# Patient Record
Sex: MALE | Race: NATIVE HAWAIIAN OR OTHER PACIFIC ISLANDER | NOT HISPANIC OR LATINO | Employment: FULL TIME | URBAN - METROPOLITAN AREA
[De-identification: names, ages, dates, MRNs, and addresses within clinical notes are randomized per-mention and may not be internally consistent; named-entity substitution may affect disease eponyms.]

---

## 2020-06-08 ENCOUNTER — NON-PROVIDER VISIT (OUTPATIENT)
Dept: OCCUPATIONAL MEDICINE | Facility: CLINIC | Age: 40
End: 2020-06-08

## 2020-06-08 DIAGNOSIS — Z11.1 ENCOUNTER FOR PPD TEST: Primary | ICD-10-CM

## 2020-06-08 PROCEDURE — 86580 TB INTRADERMAL TEST: CPT | Performed by: NURSE PRACTITIONER

## 2020-06-10 ENCOUNTER — OFFICE VISIT (OUTPATIENT)
Dept: OCCUPATIONAL MEDICINE | Facility: CLINIC | Age: 40
End: 2020-06-10

## 2020-06-10 ENCOUNTER — APPOINTMENT (OUTPATIENT)
Dept: URGENT CARE | Facility: CLINIC | Age: 40
End: 2020-06-10

## 2020-06-10 ENCOUNTER — NON-PROVIDER VISIT (OUTPATIENT)
Dept: OCCUPATIONAL MEDICINE | Facility: CLINIC | Age: 40
End: 2020-06-10

## 2020-06-10 ENCOUNTER — APPOINTMENT (OUTPATIENT)
Dept: RADIOLOGY | Facility: IMAGING CENTER | Age: 40
End: 2020-06-10
Attending: NURSE PRACTITIONER

## 2020-06-10 DIAGNOSIS — R76.11 POSITIVE PPD: ICD-10-CM

## 2020-06-10 LAB — TB WHEAL 3D P 5 TU DIAM: NORMAL MM

## 2020-06-10 PROCEDURE — 71045 X-RAY EXAM CHEST 1 VIEW: CPT | Mod: TC | Performed by: NURSE PRACTITIONER

## 2020-06-10 PROCEDURE — 8916 PR CLEARANCE ONLY: Performed by: NURSE PRACTITIONER

## 2020-06-11 NOTE — PROGRESS NOTES
Self-pay TB screening.  Positive PPD.  Chest x-ray negative.  Referred to PCP and [facility] department.

## 2022-01-19 ENCOUNTER — HOSPITAL ENCOUNTER (EMERGENCY)
Facility: MEDICAL CENTER | Age: 42
End: 2022-01-20
Attending: EMERGENCY MEDICINE
Payer: COMMERCIAL

## 2022-01-19 DIAGNOSIS — M10.9 ACUTE GOUT INVOLVING TOE OF LEFT FOOT, UNSPECIFIED CAUSE: ICD-10-CM

## 2022-01-19 PROCEDURE — 99283 EMERGENCY DEPT VISIT LOW MDM: CPT

## 2022-01-20 VITALS
RESPIRATION RATE: 16 BRPM | HEIGHT: 75 IN | SYSTOLIC BLOOD PRESSURE: 131 MMHG | BODY MASS INDEX: 36.9 KG/M2 | TEMPERATURE: 98.3 F | HEART RATE: 75 BPM | OXYGEN SATURATION: 96 % | DIASTOLIC BLOOD PRESSURE: 74 MMHG | WEIGHT: 296.74 LBS

## 2022-01-20 PROCEDURE — A9270 NON-COVERED ITEM OR SERVICE: HCPCS | Performed by: EMERGENCY MEDICINE

## 2022-01-20 PROCEDURE — 700102 HCHG RX REV CODE 250 W/ 637 OVERRIDE(OP): Performed by: EMERGENCY MEDICINE

## 2022-01-20 RX ORDER — COLCHICINE 0.6 MG/1
0.6 TABLET ORAL 2 TIMES DAILY
Qty: 28 TABLET | Refills: 0 | Status: SHIPPED | OUTPATIENT
Start: 2022-01-20 | End: 2022-02-03

## 2022-01-20 RX ORDER — COLCHICINE 0.6 MG/1
1.2 TABLET ORAL ONCE
Status: COMPLETED | OUTPATIENT
Start: 2022-01-20 | End: 2022-01-20

## 2022-01-20 RX ADMIN — COLCHICINE 1.2 MG: 0.6 TABLET ORAL at 00:25

## 2022-01-20 NOTE — ED NOTES
Discharge home with instructions, rxn  and follow up care reviewed and given to patient with verbal understanding.    Slowly ambulated out of the ED

## 2022-01-20 NOTE — ED TRIAGE NOTES
"41 yr old male to triage  Chief Complaint   Patient presents with   • Toe Pain     Patient report of left big toe pain with slight redness and swollen this morning.  Denies any recent trauma      /84   Pulse 77   Temp 36.8 °C (98.3 °F) (Temporal)   Resp 16   Ht 1.905 m (6' 3\")   Wt (!) 135 kg (296 lb 11.8 oz)   SpO2 94%   BMI 37.09 kg/m²     Has this patient been vaccinated for COVID Yes   If not, would they like to be vaccinated while in the ER if eligible?  No   Would the patient like to speak with the ERP about the possibility of receiving the COVID vaccine today before making a decision? No     "

## 2022-01-20 NOTE — ED PROVIDER NOTES
"ED Provider Note    CHIEF COMPLAINT  Chief Complaint   Patient presents with   • Toe Pain     Patient report of left big toe pain with slight redness and swollen this morning.  Denies any recent trauma        HPI  Magen Carrasco is a 41 y.o. male who presents to the emergency department for left great toe pain. Past medical history is benign. He explained that he did a heavy sushi meal a few days ago prior to the onset of this discomfort. Pain started today. No prior history the same. States that he did some Internet research and felt that this may be gout. It is extremely sensitive to touch. No trauma. No other systemic symptoms.    REVIEW OF SYSTEMS  See HPI for further details. All other systems are negative.     PAST MEDICAL HISTORY   has a past medical history of Patient denies medical problems.    SOCIAL HISTORY  Social History     Tobacco Use   • Smoking status: Never Smoker   • Smokeless tobacco: Never Used   Vaping Use   • Vaping Use: Not on file   Substance and Sexual Activity   • Alcohol use: No   • Drug use: No   • Sexual activity: Not on file       SURGICAL HISTORY  patient denies any surgical history    CURRENT MEDICATIONS  Home Medications     Reviewed by Merna Vegas R.N. (Registered Nurse) on 01/19/22 at 2204  Med List Status: Complete   Medication Last Dose Status   hydrocodone-acetaminophen (VICODIN) 5-500 MG TABS  Active                ALLERGIES  No Known Allergies    PHYSICAL EXAM  VITAL SIGNS: /84   Pulse 77   Temp 36.8 °C (98.3 °F) (Temporal)   Resp 16   Ht 1.905 m (6' 3\")   Wt (!) 135 kg (296 lb 11.8 oz)   SpO2 94%   BMI 37.09 kg/m²  @DANILO[[id number]::@  Pulse ox interpretation: I interpret this pulse ox as normal.  Constitutional: Alert in no apparent distress.  HENT: Normocephalic, Atraumatic  left lower extremity: nontender hip, thigh, knee leg or ankle. He has tenderness to even light palpation to the great toe starting at the first MTP. Mild erythema and very mild " soft tissue swelling. Full range of motion.  Skin: Warm, Dry, No erythema, No rash.   Neurologic: Alert, Grossly non-focal.   Psychiatric: Affect normal, Judgment normal, Mood normal, Appears appropriate and not intoxicated.           COURSE & MEDICAL DECISION MAKING  Pertinent Labs & Imaging studies reviewed. (See chart for details)  41-year-old male presented to the emergency departments with acute left great toe discomfort. History as above. I have a high suspicion for gout. Will start him on colchicine. First dose provided here. He is understand return precautions and has been referred to outpatient primary care provider for ongoing reevaluation care. He is understanding of diet to follow.  The patient will return for worsening symptoms and is stable at the time of discharge. The patient verbalizes understanding and will comply.    FINAL IMPRESSION  1. Acute gout involving toe of left foot, unspecified cause               Electronically signed by: Guillermo Macario M.D., 1/20/2022 12:11 AM

## 2025-04-28 ENCOUNTER — OFFICE VISIT (OUTPATIENT)
Dept: INTERNAL MEDICINE | Facility: OTHER | Age: 45
End: 2025-04-28
Payer: COMMERCIAL

## 2025-04-28 VITALS
TEMPERATURE: 98.9 F | HEIGHT: 75 IN | DIASTOLIC BLOOD PRESSURE: 75 MMHG | BODY MASS INDEX: 39.17 KG/M2 | WEIGHT: 315 LBS | SYSTOLIC BLOOD PRESSURE: 133 MMHG | OXYGEN SATURATION: 96 % | HEART RATE: 83 BPM

## 2025-04-28 DIAGNOSIS — Z13.228 SCREENING FOR ENDOCRINE, NUTRITIONAL, METABOLIC AND IMMUNITY DISORDER: ICD-10-CM

## 2025-04-28 DIAGNOSIS — Z13.21 SCREENING FOR ENDOCRINE, NUTRITIONAL, METABOLIC AND IMMUNITY DISORDER: ICD-10-CM

## 2025-04-28 DIAGNOSIS — G47.33 OBSTRUCTIVE SLEEP APNEA: ICD-10-CM

## 2025-04-28 DIAGNOSIS — M1A.0720 CHRONIC IDIOPATHIC GOUT INVOLVING TOE OF LEFT FOOT WITHOUT TOPHUS: ICD-10-CM

## 2025-04-28 DIAGNOSIS — E66.813 CLASS 3 SEVERE OBESITY DUE TO EXCESS CALORIES WITHOUT SERIOUS COMORBIDITY WITH BODY MASS INDEX (BMI) OF 40.0 TO 44.9 IN ADULT: ICD-10-CM

## 2025-04-28 DIAGNOSIS — Z11.3 SCREENING FOR STD (SEXUALLY TRANSMITTED DISEASE): ICD-10-CM

## 2025-04-28 DIAGNOSIS — Z13.29 SCREENING FOR ENDOCRINE, NUTRITIONAL, METABOLIC AND IMMUNITY DISORDER: ICD-10-CM

## 2025-04-28 DIAGNOSIS — Z13.0 SCREENING FOR ENDOCRINE, NUTRITIONAL, METABOLIC AND IMMUNITY DISORDER: ICD-10-CM

## 2025-04-28 ASSESSMENT — PATIENT HEALTH QUESTIONNAIRE - PHQ9: CLINICAL INTERPRETATION OF PHQ2 SCORE: 0

## 2025-04-28 NOTE — PROGRESS NOTES
Reason for visit:    -[facility] [place] [facility]    History of Present Illness:     Magen Carrasco is a 44-year-old male with a past medical history of obstructive sleep apnea, class III obesity, and gout, presenting today to [facility]. He has no current medical concerns and is seeking a general medical checkup.    The patient has a history of gout, clinically diagnosed in 2022 when he presented with severe pain in his left great toe. At that time, he was treated with colchicine and referred to outpatient primary care for ongoing management. He reports no gout attacks over the past three years. Occasionally, he experiences mild discomfort in the left toe, for which he takes ibuprofen, but notes that his symptoms have been significantly less severe compared to the episode in 2022.    Additionally, he was diagnosed with NIMO one year ago and is compliant with CPAP use at night.     The patient has no other medical concerns at this time and agrees with the management plan discussed during the visit.    ROS     Constitutional: No chills or fever.  HENT: No ear pain, hearing loss, or tinnitus.  Eyes: No blurred vision or double vision.  Respiratory: No cough, hemoptysis, sputum production, or shortness of breath.  Cardiovascular: Negative for chest pain, palpitations, or leg swelling.  Gastrointestinal: No nausea, blood in stool, constipation, diarrhea, melena, or vomiting.  Genitourinary: No dysuria and urgency.  Skin: No itching.  Psychiatric/Behavioral: No depression or suicidal ideas.    Past Medical History:   Past Medical History:   Diagnosis Date    Gout     NIMO (obstructive sleep apnea)     Patient denies medical problems        Patient Active Problem List    Diagnosis Date Noted    Class 3 severe obesity due to excess calories without serious comorbidity with body mass index (BMI) of 40.0 to 44.9 in adult 04/28/2025    Obstructive sleep apnea 04/28/2025    Chronic idiopathic gout involving toe of  "left foot without tophus 04/28/2025       History reviewed. No pertinent surgical history.     Allergies:  Patient has no known allergies.    Medications:   No current outpatient medications on file.     Social History:   Social History     Tobacco Use    Smoking status: Never    Smokeless tobacco: Never   Substance Use Topics    Alcohol use: Yes     Comment: Ocassionally once or twice a month    Drug use: No       Family History:   Family History   Problem Relation Age of Onset    Pancreatic Cancer Mother     Rectal Cancer Father     Diabetes Father        Vitals:   /75 (BP Location: Left arm, Patient Position: Sitting, BP Cuff Size: Large adult)   Pulse 83   Temp 37.2 °C (98.9 °F) (Temporal)   Ht 1.9 m (6' 2.8\")   Wt (!) 149 kg (329 lb)   SpO2 96%  Body mass index is 41.34 kg/m².    Physical Exam     Constitutional:       General:  The patient is in no acute distress.     Appearance: Normal appearance. He is not ill-appearing, toxic-appearing or diaphoretic.   HENT:      Head: Normocephalic and atraumatic.      Mouth/Throat:      Mouth: Mucous membranes are/not moist.      Pharynx: Oropharynx is clear. No oropharyngeal exudate or posterior oropharyngeal erythema.   Eyes:      General: No scleral icterus.        Right eye: No discharge.         Left eye: No discharge.      Conjunctiva/sclera: Normal conjunctivae.   Cardiovascular:      Rate and Rhythm: Normal rate and regular rhythm.      Pulses: Normal pulses.      Heart sounds: Normal heart sounds. No murmur heard.     No friction rub. No gallop.   Pulmonary:      Effort: Pulmonary effort is normal. No respiratory distress.      Breath sounds: Normal breath sounds. No stridor. No wheezing or rhonchi.   Abdominal:      General: Abdomen is flat. Bowel sounds are normal. There is no distension.      Palpations: Abdomen is soft. There are no masses.      Tenderness: There is no abdominal tenderness.      Hernia: No hernia is present.   Musculoskeletal:    "      General: No swelling or tenderness.      Right lower leg: No edema.      Left lower leg: No edema.    Skin:     General: Skin is warm and moist.      Coloration: Skin is not pale.      Findings: No erythema or rash.   Neurological:      General: No focal deficit present.      Mental Status: He is alert and oriented to person, place, and time. Mental status is at baseline      Assessment and Plan    1. Class 3 severe obesity due to excess calories without serious comorbidity with body mass index (BMI) of 40.0 to 44.9 in adult  The patient was educated on the importance of lifestyle changes, including engaging in physical exercise at least three times per week and improving his nutrition.   He appears highly motivated to lose weight and maintain his health.   The patient has opted to be seen by nutrition services, and a referral has been placed.  The plan is to monitor his weight, continue working on better nutrition and regular exercise over the coming weeks, and follow up in 5 weeks.  - Referral to Nutrition Services    2. Chronic idiopathic gout involving toe of left foot without tophus  The patient was clinically diagnosed with gout in 2022 when he presented with severe pain in the left great toe and was treated acutely with colchicine. He has never required uric acid-lowering therapy.  The patient reports experiencing mild left great toe discomfort only a few times per year, which resolves with ibuprofen and is significantly less severe than his initial episode in 2022. Physical examination today is negative for tophi.  - No further action needed at this time.  - Will consider further workup or initiation of uric acid-lowering therapy if the patient develops greater than or equal to two gout attacks per year or if symptoms worsen.    3. Obstructive sleep apnea  Diagnosed in 2024 an is currently on CPAP at night.  No records available in the chart.    4. Screening for endocrine, nutritional, metabolic and  immunity disorder  - Comp Metabolic Panel; Future  - Lipid Profile; Future  - HEMOGLOBIN A1C; Future    5. Screening for STD (sexually transmitted disease)  - HIV AG/AB COMBO ASSAY SCREENING; Future  - HEP C VIRUS ANTIBODY; Future       Orders Placed This Encounter    Comp Metabolic Panel    Lipid Profile    HEMOGLOBIN A1C    HIV AG/AB COMBO ASSAY SCREENING    HEP C VIRUS ANTIBODY    Referral to Nutrition Services       Return in about 5 weeks (around 6/2/2025).    I personally spent a total of 20 minutes talking to the patient and addressing all his concerns.    Please note that this dictation was created using voice recognition software. I have made every reasonable attempt to correct obvious errors, but I expect that there are errors of grammar and possibly content that I did not discover before finalizing the note.    Patient case was seen/ assessed/ discussed with Dr. Malave    Signed by:    Joseph Stevens M.D.    PGY-1 Internal Medicine Resident

## 2025-05-01 ENCOUNTER — HOSPITAL ENCOUNTER (OUTPATIENT)
Dept: LAB | Facility: MEDICAL CENTER | Age: 45
End: 2025-05-01
Payer: COMMERCIAL

## 2025-05-01 DIAGNOSIS — Z13.21 SCREENING FOR ENDOCRINE, NUTRITIONAL, METABOLIC AND IMMUNITY DISORDER: ICD-10-CM

## 2025-05-01 DIAGNOSIS — Z13.0 SCREENING FOR ENDOCRINE, NUTRITIONAL, METABOLIC AND IMMUNITY DISORDER: ICD-10-CM

## 2025-05-01 DIAGNOSIS — Z13.29 SCREENING FOR ENDOCRINE, NUTRITIONAL, METABOLIC AND IMMUNITY DISORDER: ICD-10-CM

## 2025-05-01 DIAGNOSIS — Z11.3 SCREENING FOR STD (SEXUALLY TRANSMITTED DISEASE): ICD-10-CM

## 2025-05-01 DIAGNOSIS — Z13.228 SCREENING FOR ENDOCRINE, NUTRITIONAL, METABOLIC AND IMMUNITY DISORDER: ICD-10-CM

## 2025-05-01 LAB
ALBUMIN SERPL BCP-MCNC: 4.2 G/DL (ref 3.2–4.9)
ALBUMIN/GLOB SERPL: 1.1 G/DL
ALP SERPL-CCNC: 59 U/L (ref 30–99)
ALT SERPL-CCNC: 21 U/L (ref 2–50)
ANION GAP SERPL CALC-SCNC: 11 MMOL/L (ref 7–16)
AST SERPL-CCNC: 31 U/L (ref 12–45)
BILIRUB SERPL-MCNC: 0.5 MG/DL (ref 0.1–1.5)
BUN SERPL-MCNC: 13 MG/DL (ref 8–22)
CALCIUM ALBUM COR SERPL-MCNC: 8.8 MG/DL (ref 8.5–10.5)
CALCIUM SERPL-MCNC: 9 MG/DL (ref 8.5–10.5)
CHLORIDE SERPL-SCNC: 104 MMOL/L (ref 96–112)
CHOLEST SERPL-MCNC: 130 MG/DL (ref 100–199)
CO2 SERPL-SCNC: 26 MMOL/L (ref 20–33)
CREAT SERPL-MCNC: 1.02 MG/DL (ref 0.5–1.4)
EST. AVERAGE GLUCOSE BLD GHB EST-MCNC: 126 MG/DL
GFR SERPLBLD CREATININE-BSD FMLA CKD-EPI: 93 ML/MIN/1.73 M 2
GLOBULIN SER CALC-MCNC: 3.8 G/DL (ref 1.9–3.5)
GLUCOSE SERPL-MCNC: 94 MG/DL (ref 65–99)
HBA1C MFR BLD: 6 % (ref 4–5.6)
HCV AB SER QL: NORMAL
HDLC SERPL-MCNC: 33 MG/DL
HIV 1+2 AB+HIV1 P24 AG SERPL QL IA: NORMAL
LDLC SERPL CALC-MCNC: 72 MG/DL
POTASSIUM SERPL-SCNC: 4.2 MMOL/L (ref 3.6–5.5)
PROT SERPL-MCNC: 8 G/DL (ref 6–8.2)
SODIUM SERPL-SCNC: 141 MMOL/L (ref 135–145)
TRIGL SERPL-MCNC: 126 MG/DL (ref 0–149)

## 2025-05-01 PROCEDURE — 83036 HEMOGLOBIN GLYCOSYLATED A1C: CPT

## 2025-05-01 PROCEDURE — 80053 COMPREHEN METABOLIC PANEL: CPT

## 2025-05-01 PROCEDURE — 87389 HIV-1 AG W/HIV-1&-2 AB AG IA: CPT

## 2025-05-01 PROCEDURE — 36415 COLL VENOUS BLD VENIPUNCTURE: CPT

## 2025-05-01 PROCEDURE — 86803 HEPATITIS C AB TEST: CPT

## 2025-05-01 PROCEDURE — 80061 LIPID PANEL: CPT

## 2025-05-03 NOTE — Clinical Note
REFERRAL APPROVAL NOTICE         Sent on May 2, 2025                   Magen Carrasco  [address] [place] [address]                   Dear Mr. Carrasco,    After a careful review of the medical information and benefit coverage, [facility] has processed your referral. See below for additional details.    If applicable, you must be actively enrolled with your insurance for coverage of the authorized service. If you have any questions regarding your coverage, please contact your insurance directly.    REFERRAL INFORMATION   Referral #:  [id number]  Referred-To Department    Referred-By Provider:  Internal Medicine    Joseph Greenwood M.D.   [facility]      [address] [facility] [address]  [phone] [address] [facility] [id number] [phone]    Referral Start Date:  04/28/2025  Referral End Date:   04/28/2026             SCHEDULING  If you do not already have an appointment, please call [phone] to make an appointment.     MORE INFORMATION  If you do not already have a [employer] account, sign up at: [email or web address]  You can access your medical information, make appointments, see lab results, billing information, and more.  If you have questions regarding this referral, please contact  the [facility] Referrals department at:             [phone]. Monday - Friday 8:00AM - 5:00PM.     Sincerely,    [facility]